# Patient Record
Sex: FEMALE | URBAN - METROPOLITAN AREA
[De-identification: names, ages, dates, MRNs, and addresses within clinical notes are randomized per-mention and may not be internally consistent; named-entity substitution may affect disease eponyms.]

---

## 2023-03-20 ENCOUNTER — APPOINTMENT (OUTPATIENT)
Dept: URBAN - METROPOLITAN AREA CLINIC 209 | Age: 63
Setting detail: DERMATOLOGY
End: 2023-03-22

## 2023-03-20 DIAGNOSIS — L663 OTHER SPECIFIED DISEASES OF HAIR AND HAIR FOLLICLES: ICD-10-CM

## 2023-03-20 DIAGNOSIS — L63.8 OTHER ALOPECIA AREATA: ICD-10-CM

## 2023-03-20 DIAGNOSIS — L21.8 OTHER SEBORRHEIC DERMATITIS: ICD-10-CM

## 2023-03-20 DIAGNOSIS — L738 OTHER SPECIFIED DISEASES OF HAIR AND HAIR FOLLICLES: ICD-10-CM

## 2023-03-20 PROBLEM — L02.821 FURUNCLE OF HEAD [ANY PART, EXCEPT FACE]: Status: ACTIVE | Noted: 2023-03-20

## 2023-03-20 PROCEDURE — OTHER COUNSELING: OTHER

## 2023-03-20 PROCEDURE — OTHER INVENTORY: OTHER

## 2023-03-20 PROCEDURE — OTHER MIPS QUALITY: OTHER

## 2023-03-20 PROCEDURE — 99204 OFFICE O/P NEW MOD 45 MIN: CPT

## 2023-03-20 PROCEDURE — OTHER PRESCRIPTION MEDICATION MANAGEMENT: OTHER

## 2023-03-20 ASSESSMENT — LOCATION SIMPLE DESCRIPTION DERM: LOCATION SIMPLE: POSTERIOR SCALP

## 2023-03-20 ASSESSMENT — LOCATION DETAILED DESCRIPTION DERM
LOCATION DETAILED: RIGHT SUPERIOR OCCIPITAL SCALP
LOCATION DETAILED: LEFT SUPERIOR OCCIPITAL SCALP
LOCATION DETAILED: MID-OCCIPITAL SCALP

## 2023-03-20 ASSESSMENT — PAIN INTENSITY VAS: HOW INTENSE IS YOUR PAIN 0 BEING NO PAIN, 10 BEING THE MOST SEVERE PAIN POSSIBLE?: 2/10 PAIN

## 2023-03-20 ASSESSMENT — LOCATION ZONE DERM: LOCATION ZONE: SCALP

## 2023-03-20 NOTE — PROCEDURE: COUNSELING
Detail Level: Zone
Bpo Recommendations: 5% DSC, CeraVe 4%, Panoxyl 4%
Cleanser Recommendations: Yady
Shampoo Recommendations: Neutrogena T-Sal, Selsun Blue, Head & Shoulders
Moisturizer Recommendations: for face the Cetaphil, CeraVe, Aveeno
Topical Antifungal Recommendations: Rx- Ketoconazole
Topical Steroid Recommendations: Rx- Desonide, Hydrocortisone, or Clobetsol (type of steroid depends on loction of use)

## 2023-03-20 NOTE — PROCEDURE: PRESCRIPTION MEDICATION MANAGEMENT
Render In Strict Bullet Format?: No
Plan: ISDIN Hair Density Kit was suggested for Alopecia Areata not for Folliculitis .
Detail Level: Zone
Modify Regimen: Stop current scalp treatments prescribed by other provider
Initiate Treatment: Use ketoconazole shampoo for itch daily as needed\\n\\nUse clobetasol solution  twice daily for itching  for up to 2 weeks
Initiate Treatment: .\\nUse a high quality shampoos and conditioner such as ISDIN Hair Density Kit, twice weekly